# Patient Record
Sex: FEMALE | Race: WHITE
[De-identification: names, ages, dates, MRNs, and addresses within clinical notes are randomized per-mention and may not be internally consistent; named-entity substitution may affect disease eponyms.]

---

## 2017-12-07 ENCOUNTER — HOSPITAL ENCOUNTER (EMERGENCY)
Dept: HOSPITAL 53 - M ED | Age: 82
Discharge: HOME | End: 2017-12-07
Payer: MEDICAID

## 2017-12-07 VITALS — DIASTOLIC BLOOD PRESSURE: 70 MMHG | SYSTOLIC BLOOD PRESSURE: 166 MMHG

## 2017-12-07 VITALS — WEIGHT: 169.32 LBS | HEIGHT: 61 IN | BODY MASS INDEX: 31.97 KG/M2
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
## 2017-12-07 DIAGNOSIS — S93.401A: Primary | ICD-10-CM

## 2017-12-07 DIAGNOSIS — Z88.5: ICD-10-CM

## 2017-12-07 DIAGNOSIS — D50.9: ICD-10-CM

## 2017-12-07 DIAGNOSIS — I10: ICD-10-CM

## 2017-12-07 DIAGNOSIS — E11.9: ICD-10-CM

## 2017-12-07 DIAGNOSIS — E78.00: ICD-10-CM

## 2017-12-07 DIAGNOSIS — E73.9: ICD-10-CM

## 2017-12-07 DIAGNOSIS — Z87.891: ICD-10-CM
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
## 2017-12-07 DIAGNOSIS — Z79.899: ICD-10-CM

## 2017-12-07 DIAGNOSIS — Z79.84: ICD-10-CM

## 2017-12-07 DIAGNOSIS — Z79.82: ICD-10-CM
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
## 2017-12-07 NOTE — REP
RIGHT FOOT COMPLETE:  12/07/2017

 

CLINICAL HISTORY:   Trauma.

 

Comparison is the right ankle this date.

 

Prominent plantar calcaneal spur noted.  Subtalar joints intact.  Small ossific

avulsion of the distal tip of the medial malleolus and presumed new avulsion off

the lateral malleolar tip.  Talonavicular calcaneal cuboid joints preserved.

Tiny calcific densities anterior to the ankle margin noted.  The anterior process

of the talus showed small ossific avulsions on the ankle series but obscured on

this examination.  Tarsal bones and their articulations show no definite acute

fractures.  The proximal metatarsals show degenerative changes without definite

acute fracture.  MTP and IP joints show degenerative changes.

 

IMPRESSION:

 

1.  Prominent soft tissue swelling ankle and hindfoot, tiny avulsion off the

distal tip of the lateral malleolus likely acute, old avulsion off the distal tip

medial malleolus, better seen on the ankle series.   No other significant or

acute bony finding.

 

 

Signed by

Nas Ross MD 12/07/2017 08:32 P

## 2017-12-07 NOTE — REP
RIGHT TIBIA-FIBULA:  12/07/2017.

 

Clinical history:  Trauma.

 

Comparison:  No prior study.

 

Findings:  The two views show the proximal two-thirds of the tibia and fibula as

well as the knee in part.  There is a knee arthroplasty with the components

visible well aligned in relationship to the native bone and each other.  I do not

see a visible acute fracture of the tibia or fibula.  There are some vascular

calcifications evident.

 

Impression:

 

1.  Prior knee arthroplasty with visible tibia and fibula without fracture or

focal lesion.

 

 

Signed by

Nas Ross MD 12/08/2017 08:24 A

## 2017-12-07 NOTE — REP
RIGHT ANKLE COMPLETE:  12/07/2017

 

COMPARISON:  Right tibia-fibula and foot series this date.

 

CLINICAL HISTORY:  Trauma.

 

Four views show prominent swelling about the anterolateral aspect of the ankle

and hind foot.  Mortise joint appears grossly symmetric and preserved.  There is

smoothly marginated ossific density inferior to the medial malleolus.  There are

tiny ossific densities on the lateral view anterior to the tibial plafond and

talus and on oblique view, there are tiny ossific densities suggesting avulsions

off the lateral aspect anterior talus.  Small avulsions off the distal tip of the

fibula also suggested.  This suggests ligamentous avulsion injury.  There is a

plantar heel spur without an Achilles spur.  The subtalar joints intact.

Talonavicular, calcaneocuboid joints unremarkable.  Tarsal bones visible are

intact.  I do not see acute fracture of the proximal metatarsals.

 

IMPRESSION:

 

1. Prominent soft tissue swelling of the anterolateral aspect of the ankle with

tiny avulsions off the talus and inferior aspect of the lateral malleolus

suggesting ligamentous avulsion.

 

2. Tiny old avulsion off the medial malleolus with the mortise joint intact.

 

3.  Plantar calcaneal spur.  Degenerative changes.  Tarsal bones without acute

fracture. Mortise joint preserved.

 

 

Signed by

Nas Ross MD 12/07/2017 08:32 P

## 2017-12-11 NOTE — ED PDOC
Post-Departure Follow-Up


s[facility] and [facility] faxed formal report of right foot film for [facility] [place]-Gray,Maja MD Dec 11, 2017 13:09

## 2018-04-13 ENCOUNTER — HOSPITAL ENCOUNTER (OUTPATIENT)
Dept: HOSPITAL 53 - M RAD | Age: 83
End: 2018-04-13
Attending: NURSE PRACTITIONER
Payer: MEDICARE

## 2018-04-13 DIAGNOSIS — R63.0: ICD-10-CM

## 2018-04-13 DIAGNOSIS — R11.10: Primary | ICD-10-CM

## 2018-04-13 DIAGNOSIS — R63.4: ICD-10-CM

## 2018-05-19 ENCOUNTER — HOSPITAL ENCOUNTER (INPATIENT)
Dept: HOSPITAL 53 - M ED | Age: 83
LOS: 5 days | Discharge: NURSING, REHAB OR LONG-TERM CARE | DRG: 308 | End: 2018-05-24
Attending: GENERAL PRACTICE | Admitting: INTERNAL MEDICINE
Payer: MEDICARE

## 2018-05-19 DIAGNOSIS — I82.432: ICD-10-CM

## 2018-05-19 DIAGNOSIS — I48.2: Primary | ICD-10-CM

## 2018-05-19 DIAGNOSIS — I25.5: ICD-10-CM

## 2018-05-19 DIAGNOSIS — Z79.899: ICD-10-CM

## 2018-05-19 DIAGNOSIS — I11.0: ICD-10-CM

## 2018-05-19 DIAGNOSIS — I50.22: ICD-10-CM

## 2018-05-19 DIAGNOSIS — Z88.8: ICD-10-CM

## 2018-05-19 DIAGNOSIS — Z88.5: ICD-10-CM

## 2018-05-19 DIAGNOSIS — R62.7: ICD-10-CM

## 2018-05-19 DIAGNOSIS — I26.99: ICD-10-CM

## 2018-05-19 DIAGNOSIS — E11.9: ICD-10-CM

## 2018-05-19 LAB
ALBUMIN/GLOBULIN RATIO: 1.03 (ref 1–1.93)
ALBUMIN: 3.7 GM/DL (ref 3.2–5.2)
ALKALINE PHOSPHATASE: 70 U/L (ref 45–117)
ALT SERPL W P-5'-P-CCNC: 19 U/L (ref 12–78)
AMMONIA PLAS-SCNC: < 10 UMOL/L (ref ?–32)
ANION GAP: 10 MEQ/L (ref 8–16)
AST SERPL-CCNC: 18 U/L (ref 7–37)
BASO #: 0 10^3/UL (ref 0–0.2)
BASO %: 0.2 % (ref 0–1)
BILIRUB CONJ SERPL-MCNC: 0.2 MG/DL (ref 0–0.2)
BILIRUBIN,TOTAL: 0.6 MG/DL (ref 0.2–1)
BLOOD UREA NITROGEN: 21 MG/DL (ref 7–18)
CALCIUM LEVEL: 9.2 MG/DL (ref 8.8–10.2)
CARBON DIOXIDE LEVEL: 24 MEQ/L (ref 21–32)
CHLORIDE LEVEL: 107 MEQ/L (ref 98–107)
CK MB CFR.DF SERPL CALC: 1.58
CK SERPL-CCNC: 151 U/L (ref 26–192)
CK-MB VALUE MASS: 2.4 NG/ML (ref ?–3.6)
CREATININE FOR GFR: 1.17 MG/DL (ref 0.55–1.3)
EOS #: 0 10^3/UL (ref 0–0.5)
EOSINOPHIL NFR BLD AUTO: 0.1 % (ref 0–3)
GFR SERPL CREATININE-BSD FRML MDRD: 47.4 ML/MIN/{1.73_M2} (ref 32–?)
GLUCOSE BLDC GLUCOMTR-MCNC: 164 MG/DL (ref 83–110)
GLUCOSE, FASTING: 148 MG/DL (ref 70–100)
HEMATOCRIT: 36.6 % (ref 36–47)
HEMOGLOBIN: 12 G/DL (ref 12–15.5)
IMMATURE GRANULOCYTE %: 1 % (ref 0–3)
INR: 1.16
LEUKOCYTE ESTERASE UR AUTO RFX: (no result)
LYMPH #: 1 10^3/UL (ref 1.5–4.5)
LYMPH %: 8.8 % (ref 24–44)
MAGNESIUM LEVEL: 1.8 MG/DL (ref 1.8–2.4)
MEAN CORPUSCULAR HEMOGLOBIN: 29.6 PG (ref 27–33)
MEAN CORPUSCULAR HGB CONC: 32.8 G/DL (ref 32–36.5)
MEAN CORPUSCULAR VOLUME: 90.4 FL (ref 80–96)
MONO #: 0.9 10^3/UL (ref 0–0.8)
MONO %: 8.7 % (ref 0–5)
NEUTROPHILS #: 8.8 10^3/UL (ref 1.8–7.7)
NEUTROPHILS %: 81.2 % (ref 36–66)
NRBC BLD AUTO-RTO: 0 % (ref 0–0)
NT-PRO BNP: 5877 PG/ML (ref ?–450)
PARTIAL THROMBOPLASTIN TIME: 28.1 SECONDS (ref 26.8–37.9)
PLATELET COUNT, AUTOMATED: 161 10^3/UL (ref 150–450)
POTASSIUM SERUM: 4.5 MEQ/L (ref 3.5–5.1)
PROTHROMBIN TIME: 15 SECONDS (ref 12.4–14.5)
RED BLOOD COUNT: 4.05 10^6/UL (ref 4–5.4)
RED CELL DISTRIBUTION WIDTH: 13.9 % (ref 11.5–14.5)
SODIUM LEVEL: 141 MEQ/L (ref 136–145)
SPECIFIC GRAVITY UR AUTO RFX: >1.06 (ref 1–1.03)
SQUAM EPITHELIAL CELL UR AURFX: 9 /HPF (ref 0–6)
THYROID STIMULATING HORMONE: 1.93 UIU/ML (ref 0.36–3.74)
TOTAL PROTEIN: 7.3 GM/DL (ref 6.4–8.2)
TROPONIN I: 0.04 NG/ML (ref ?–0.1)
VENOUS BASE EXCESS: -1 (ref -2–2)
VENOUS HCO3: 24.4 MEQ/L (ref 23–27)
VENOUS O2 SATURATION: 93.2 % (ref 60–80)
VENOUS PARTIAL PRESSURE CO2: 43.5 MMHG (ref 38–50)
VENOUS PARTIAL PRESSURE O2: 70.2 MMHG (ref 30–50)
VENOUS PH: 7.37 UNITS (ref 7.33–7.43)
VENOUS STANDARD HCO3: 23.5 MEQ/L
VENOUS TOTAL CO2: 25.7 MEQ/L (ref 24–28)
WHITE BLOOD COUNT: 10.9 10^3/UL (ref 4–10)

## 2018-05-19 RX ADMIN — SODIUM CHLORIDE 1 MLS/HR: 9 INJECTION, SOLUTION INTRAVENOUS at 14:52

## 2018-05-19 RX ADMIN — SODIUM CHLORIDE 1 MLS/HR: 9 INJECTION, SOLUTION INTRAVENOUS at 14:19

## 2018-05-19 RX ADMIN — METOPROLOL TARTRATE 1 MG: 5 INJECTION INTRAVENOUS at 14:52

## 2018-05-19 RX ADMIN — ENOXAPARIN SODIUM 1 MG: 80 INJECTION SUBCUTANEOUS at 20:09

## 2018-05-19 RX ADMIN — METOPROLOL TARTRATE 1 MG: 5 INJECTION INTRAVENOUS at 14:19

## 2018-05-19 RX ADMIN — METOPROLOL TARTRATE 1 MG: 5 INJECTION INTRAVENOUS at 15:46

## 2018-05-19 RX ADMIN — SODIUM CHLORIDE 1 MLS/HR: 9 INJECTION, SOLUTION INTRAVENOUS at 16:15

## 2018-05-19 RX ADMIN — METOPROLOL TARTRATE 1 MG: 25 TABLET, FILM COATED ORAL at 20:09

## 2018-05-19 RX ADMIN — METOPROLOL TARTRATE 1 MG: 25 TABLET, FILM COATED ORAL at 14:17

## 2018-05-19 RX ADMIN — CEFTRIAXONE 1 MLS/HR: 1 INJECTION, POWDER, FOR SOLUTION INTRAMUSCULAR; INTRAVENOUS at 20:08

## 2018-05-20 LAB
ANION GAP: 7 MEQ/L (ref 8–16)
BLOOD UREA NITROGEN: 19 MG/DL (ref 7–18)
CALCIUM LEVEL: 8.2 MG/DL (ref 8.8–10.2)
CARBON DIOXIDE LEVEL: 24 MEQ/L (ref 21–32)
CHLORIDE LEVEL: 111 MEQ/L (ref 98–107)
CREATININE FOR GFR: 0.79 MG/DL (ref 0.55–1.3)
GFR SERPL CREATININE-BSD FRML MDRD: > 60 ML/MIN/{1.73_M2} (ref 32–?)
GLUCOSE, FASTING: 100 MG/DL (ref 70–100)
HEMATOCRIT: 32.1 % (ref 36–47)
HEMOGLOBIN: 10.5 G/DL (ref 12–15.5)
MAGNESIUM LEVEL: 1.7 MG/DL (ref 1.8–2.4)
MEAN CORPUSCULAR HEMOGLOBIN: 29.5 PG (ref 27–33)
MEAN CORPUSCULAR HGB CONC: 32.7 G/DL (ref 32–36.5)
MEAN CORPUSCULAR VOLUME: 90.2 FL (ref 80–96)
NRBC BLD AUTO-RTO: 0 % (ref 0–0)
PLATELET COUNT, AUTOMATED: 147 10^3/UL (ref 150–450)
POTASSIUM SERUM: 4 MEQ/L (ref 3.5–5.1)
RED BLOOD COUNT: 3.56 10^6/UL (ref 4–5.4)
RED CELL DISTRIBUTION WIDTH: 13.9 % (ref 11.5–14.5)
SODIUM LEVEL: 142 MEQ/L (ref 136–145)
WHITE BLOOD COUNT: 7.9 10^3/UL (ref 4–10)

## 2018-05-20 RX ADMIN — DIGOXIN 1 MG: 0.25 INJECTION INTRAMUSCULAR; INTRAVENOUS at 20:11

## 2018-05-20 RX ADMIN — NYSTATIN 1 DOSE: 100000 POWDER TOPICAL at 20:12

## 2018-05-20 RX ADMIN — DIGOXIN 1 MG: 0.25 INJECTION INTRAMUSCULAR; INTRAVENOUS at 08:34

## 2018-05-20 RX ADMIN — METOPROLOL TARTRATE 1 MG: 25 TABLET, FILM COATED ORAL at 06:00

## 2018-05-20 RX ADMIN — ENOXAPARIN SODIUM 1 MG: 80 INJECTION SUBCUTANEOUS at 08:35

## 2018-05-20 RX ADMIN — CEFTRIAXONE 1 MLS/HR: 1 INJECTION, POWDER, FOR SOLUTION INTRAMUSCULAR; INTRAVENOUS at 20:12

## 2018-05-20 RX ADMIN — MULTIPLE VITAMINS W/ MINERALS TAB 1 TAB: TAB at 08:35

## 2018-05-20 RX ADMIN — DIGOXIN 1 MG: 0.25 INJECTION INTRAMUSCULAR; INTRAVENOUS at 14:21

## 2018-05-20 RX ADMIN — METOPROLOL TARTRATE 1 MG: 25 TABLET, FILM COATED ORAL at 00:46

## 2018-05-20 RX ADMIN — ENOXAPARIN SODIUM 1 MG: 80 INJECTION SUBCUTANEOUS at 20:12

## 2018-05-20 RX ADMIN — MAGNESIUM SULFATE IN DEXTROSE 1 MLS/HR: 10 INJECTION, SOLUTION INTRAVENOUS at 08:35

## 2018-05-20 RX ADMIN — NYSTATIN 1 DOSE: 100000 POWDER TOPICAL at 09:00

## 2018-05-21 LAB
ANION GAP: 8 MEQ/L (ref 8–16)
BLOOD UREA NITROGEN: 16 MG/DL (ref 7–18)
CALCIUM LEVEL: 8.3 MG/DL (ref 8.8–10.2)
CARBON DIOXIDE LEVEL: 25 MEQ/L (ref 21–32)
CHLORIDE LEVEL: 110 MEQ/L (ref 98–107)
CREATININE FOR GFR: 0.82 MG/DL (ref 0.55–1.3)
DIGOXIN LEVEL: 1 NG/ML (ref 0.5–2)
GFR SERPL CREATININE-BSD FRML MDRD: > 60 ML/MIN/{1.73_M2} (ref 32–?)
GLUCOSE, FASTING: 96 MG/DL (ref 70–100)
HEMATOCRIT: 33.7 % (ref 36–47)
HEMOGLOBIN: 11.1 G/DL (ref 12–15.5)
MAGNESIUM LEVEL: 1.9 MG/DL (ref 1.8–2.4)
MEAN CORPUSCULAR HEMOGLOBIN: 30.1 PG (ref 27–33)
MEAN CORPUSCULAR HGB CONC: 32.9 G/DL (ref 32–36.5)
MEAN CORPUSCULAR VOLUME: 91.3 FL (ref 80–96)
NRBC BLD AUTO-RTO: 0 % (ref 0–0)
PLATELET COUNT, AUTOMATED: 160 10^3/UL (ref 150–450)
POTASSIUM SERUM: 3.8 MEQ/L (ref 3.5–5.1)
RED BLOOD COUNT: 3.69 10^6/UL (ref 4–5.4)
RED CELL DISTRIBUTION WIDTH: 13.8 % (ref 11.5–14.5)
SODIUM LEVEL: 143 MEQ/L (ref 136–145)
WHITE BLOOD COUNT: 8.3 10^3/UL (ref 4–10)

## 2018-05-21 RX ADMIN — SODIUM CHLORIDE, PRESERVATIVE FREE 1 ML: 5 INJECTION INTRAVENOUS at 20:11

## 2018-05-21 RX ADMIN — METOPROLOL TARTRATE 1 MG: 5 INJECTION INTRAVENOUS at 06:30

## 2018-05-21 RX ADMIN — ATENOLOL 1 MG: 25 TABLET ORAL at 09:00

## 2018-05-21 RX ADMIN — ENOXAPARIN SODIUM 1 MG: 80 INJECTION SUBCUTANEOUS at 10:08

## 2018-05-21 RX ADMIN — SODIUM CHLORIDE, PRESERVATIVE FREE 1 ML: 5 INJECTION INTRAVENOUS at 13:24

## 2018-05-21 RX ADMIN — ATENOLOL 1 MG: 25 TABLET ORAL at 20:10

## 2018-05-21 RX ADMIN — MULTIPLE VITAMINS W/ MINERALS TAB 1 TAB: TAB at 10:07

## 2018-05-21 RX ADMIN — ENOXAPARIN SODIUM 1 MG: 80 INJECTION SUBCUTANEOUS at 20:11

## 2018-05-21 RX ADMIN — NYSTATIN 1 DOSE: 100000 POWDER TOPICAL at 20:11

## 2018-05-21 RX ADMIN — NYSTATIN 1 DOSE: 100000 POWDER TOPICAL at 10:08

## 2018-05-21 RX ADMIN — SODIUM CHLORIDE, PRESERVATIVE FREE 1 ML: 5 INJECTION INTRAVENOUS at 21:33

## 2018-05-21 RX ADMIN — METOPROLOL TARTRATE 1 MG: 5 INJECTION INTRAVENOUS at 13:23

## 2018-05-22 LAB
ANION GAP: 7 MEQ/L (ref 8–16)
BLOOD UREA NITROGEN: 11 MG/DL (ref 7–18)
CALCIUM LEVEL: 8.1 MG/DL (ref 8.8–10.2)
CARBON DIOXIDE LEVEL: 26 MEQ/L (ref 21–32)
CHLORIDE LEVEL: 110 MEQ/L (ref 98–107)
CREATININE FOR GFR: 0.7 MG/DL (ref 0.55–1.3)
DIGOXIN LEVEL: 0.7 NG/ML (ref 0.5–2)
GFR SERPL CREATININE-BSD FRML MDRD: > 60 ML/MIN/{1.73_M2} (ref 32–?)
GLUCOSE, FASTING: 96 MG/DL (ref 70–100)
HEMATOCRIT: 35.1 % (ref 36–47)
HEMOGLOBIN: 11.4 G/DL (ref 12–15.5)
MAGNESIUM LEVEL: 1.8 MG/DL (ref 1.8–2.4)
MEAN CORPUSCULAR HEMOGLOBIN: 29.6 PG (ref 27–33)
MEAN CORPUSCULAR HGB CONC: 32.5 G/DL (ref 32–36.5)
MEAN CORPUSCULAR VOLUME: 91.2 FL (ref 80–96)
NRBC BLD AUTO-RTO: 0 % (ref 0–0)
PLATELET COUNT, AUTOMATED: 191 10^3/UL (ref 150–450)
POTASSIUM SERUM: 4.2 MEQ/L (ref 3.5–5.1)
RED BLOOD COUNT: 3.85 10^6/UL (ref 4–5.4)
RED CELL DISTRIBUTION WIDTH: 13.7 % (ref 11.5–14.5)
SODIUM LEVEL: 143 MEQ/L (ref 136–145)
WHITE BLOOD COUNT: 8 10^3/UL (ref 4–10)

## 2018-05-22 RX ADMIN — NYSTATIN 1 DOSE: 100000 POWDER TOPICAL at 07:44

## 2018-05-22 RX ADMIN — SODIUM CHLORIDE, PRESERVATIVE FREE 1 ML: 5 INJECTION INTRAVENOUS at 06:00

## 2018-05-22 RX ADMIN — ATENOLOL 1 MG: 25 TABLET ORAL at 07:45

## 2018-05-22 RX ADMIN — SODIUM CHLORIDE, PRESERVATIVE FREE 1 ML: 5 INJECTION INTRAVENOUS at 20:23

## 2018-05-22 RX ADMIN — ENOXAPARIN SODIUM 1 MG: 80 INJECTION SUBCUTANEOUS at 07:45

## 2018-05-22 RX ADMIN — SODIUM CHLORIDE, PRESERVATIVE FREE 1 ML: 5 INJECTION INTRAVENOUS at 14:00

## 2018-05-22 RX ADMIN — NYSTATIN 1 DOSE: 100000 POWDER TOPICAL at 20:22

## 2018-05-22 RX ADMIN — ATENOLOL 1 MG: 25 TABLET ORAL at 20:20

## 2018-05-22 RX ADMIN — SODIUM CHLORIDE, PRESERVATIVE FREE 1 ML: 5 INJECTION INTRAVENOUS at 22:00

## 2018-05-22 RX ADMIN — MULTIPLE VITAMINS W/ MINERALS TAB 1 TAB: TAB at 07:44

## 2018-05-22 RX ADMIN — ENOXAPARIN SODIUM 1 MG: 80 INJECTION SUBCUTANEOUS at 20:22

## 2018-05-23 LAB
ANION GAP: 6 MEQ/L (ref 8–16)
BLOOD UREA NITROGEN: 12 MG/DL (ref 7–18)
CALCIUM LEVEL: 8.6 MG/DL (ref 8.8–10.2)
CARBON DIOXIDE LEVEL: 27 MEQ/L (ref 21–32)
CHLORIDE LEVEL: 108 MEQ/L (ref 98–107)
CREATININE FOR GFR: 0.66 MG/DL (ref 0.55–1.3)
DIGOXIN LEVEL: 0.6 NG/ML (ref 0.5–2)
DRVV CONFIRM: 42.7 SEC
DRVVT CONFIRM PPP QL: 53.9 SEC
DRVVT SCREEN TO CONFIRM RATIO: 1.3 {RATIO} (ref 0–1.2)
GFR SERPL CREATININE-BSD FRML MDRD: > 60 ML/MIN/{1.73_M2} (ref 32–?)
GLUCOSE, FASTING: 105 MG/DL (ref 70–100)
HEMATOCRIT: 33.5 % (ref 36–47)
HEMOGLOBIN: 11 G/DL (ref 12–15.5)
LUPUS CONFIRM RATIO: 1.1
MAGNESIUM LEVEL: 1.8 MG/DL (ref 1.8–2.4)
MEAN CORPUSCULAR HEMOGLOBIN: 29.6 PG (ref 27–33)
MEAN CORPUSCULAR HGB CONC: 32.8 G/DL (ref 32–36.5)
MEAN CORPUSCULAR VOLUME: 90.1 FL (ref 80–96)
NORMALIZED RATIO: 1.18 (ref 0–1.2)
NRBC BLD AUTO-RTO: 0 % (ref 0–0)
PLATELET COUNT, AUTOMATED: 210 10^3/UL (ref 150–450)
POTASSIUM SERUM: 4.1 MEQ/L (ref 3.5–5.1)
RED BLOOD COUNT: 3.72 10^6/UL (ref 4–5.4)
RED CELL DISTRIBUTION WIDTH: 13.9 % (ref 11.5–14.5)
SODIUM LEVEL: 141 MEQ/L (ref 136–145)
WHITE BLOOD COUNT: 8 10^3/UL (ref 4–10)

## 2018-05-23 RX ADMIN — SODIUM CHLORIDE, PRESERVATIVE FREE 1 ML: 5 INJECTION INTRAVENOUS at 11:14

## 2018-05-23 RX ADMIN — SODIUM CHLORIDE, PRESERVATIVE FREE 1 ML: 5 INJECTION INTRAVENOUS at 20:04

## 2018-05-23 RX ADMIN — ENOXAPARIN SODIUM 1 MG: 80 INJECTION SUBCUTANEOUS at 07:39

## 2018-05-23 RX ADMIN — DIGOXIN 1 MG: 250 TABLET ORAL at 07:39

## 2018-05-23 RX ADMIN — ENOXAPARIN SODIUM 1 MG: 80 INJECTION SUBCUTANEOUS at 20:03

## 2018-05-23 RX ADMIN — NYSTATIN 1 DOSE: 100000 POWDER TOPICAL at 20:03

## 2018-05-23 RX ADMIN — SODIUM CHLORIDE, PRESERVATIVE FREE 1 ML: 5 INJECTION INTRAVENOUS at 06:00

## 2018-05-23 RX ADMIN — DIGOXIN 1 MG: 250 TABLET ORAL at 08:31

## 2018-05-23 RX ADMIN — ATENOLOL 1 MG: 25 TABLET ORAL at 20:02

## 2018-05-23 RX ADMIN — ATENOLOL 1 MG: 25 TABLET ORAL at 08:31

## 2018-05-23 RX ADMIN — MULTIPLE VITAMINS W/ MINERALS TAB 1 TAB: TAB at 08:31

## 2018-05-23 RX ADMIN — NYSTATIN 1 DOSE: 100000 POWDER TOPICAL at 08:30

## 2018-05-24 LAB
ANION GAP: 7 MEQ/L (ref 8–16)
BLOOD UREA NITROGEN: 14 MG/DL (ref 7–18)
CALCIUM LEVEL: 8.1 MG/DL (ref 8.8–10.2)
CARBON DIOXIDE LEVEL: 26 MEQ/L (ref 21–32)
CHLORIDE LEVEL: 109 MEQ/L (ref 98–107)
CREATININE FOR GFR: 0.65 MG/DL (ref 0.55–1.3)
GFR SERPL CREATININE-BSD FRML MDRD: > 60 ML/MIN/{1.73_M2} (ref 32–?)
GLUCOSE, FASTING: 86 MG/DL (ref 70–100)
HEMATOCRIT: 33.1 % (ref 36–47)
HEMOGLOBIN: 10.9 G/DL (ref 12–15.5)
MAGNESIUM LEVEL: 1.8 MG/DL (ref 1.8–2.4)
MEAN CORPUSCULAR HEMOGLOBIN: 29.9 PG (ref 27–33)
MEAN CORPUSCULAR HGB CONC: 32.9 G/DL (ref 32–36.5)
MEAN CORPUSCULAR VOLUME: 90.7 FL (ref 80–96)
NRBC BLD AUTO-RTO: 0 % (ref 0–0)
PLATELET COUNT, AUTOMATED: 214 10^3/UL (ref 150–450)
POTASSIUM SERUM: 3.9 MEQ/L (ref 3.5–5.1)
RED BLOOD COUNT: 3.65 10^6/UL (ref 4–5.4)
RED CELL DISTRIBUTION WIDTH: 13.7 % (ref 11.5–14.5)
SODIUM LEVEL: 142 MEQ/L (ref 136–145)
WHITE BLOOD COUNT: 6.7 10^3/UL (ref 4–10)

## 2018-05-24 RX ADMIN — SODIUM CHLORIDE, PRESERVATIVE FREE 1 ML: 5 INJECTION INTRAVENOUS at 05:44

## 2018-05-24 RX ADMIN — SODIUM CHLORIDE, PRESERVATIVE FREE 1 ML: 5 INJECTION INTRAVENOUS at 11:36

## 2018-05-24 RX ADMIN — NYSTATIN 1 DOSE: 100000 POWDER TOPICAL at 08:48

## 2018-05-24 RX ADMIN — RAMIPRIL 1 MG: 1.25 CAPSULE ORAL at 08:48

## 2018-05-24 RX ADMIN — DIGOXIN 1 MG: 250 TABLET ORAL at 08:49

## 2018-05-24 RX ADMIN — ATENOLOL 1 MG: 25 TABLET ORAL at 08:49

## 2018-05-24 RX ADMIN — ENOXAPARIN SODIUM 1 MG: 80 INJECTION SUBCUTANEOUS at 08:48

## 2018-05-24 RX ADMIN — MULTIPLE VITAMINS W/ MINERALS TAB 1 TAB: TAB at 08:48

## 2018-05-26 ENCOUNTER — HOSPITAL ENCOUNTER (OUTPATIENT)
Dept: HOSPITAL 53 - SKLAB7 | Age: 83
End: 2018-05-26
Attending: INTERNAL MEDICINE

## 2018-05-26 DIAGNOSIS — I48.91: Primary | ICD-10-CM

## 2018-05-26 LAB
ANTI THROMBIN 3 ANTIGEN IMMUNO: 93 % (ref 72–124)
AT III ACT/NOR PPP CHRO: 120 % (ref 75–135)
CARDIOLIPIN IGA ANTIBODY: 9 APL U/ML (ref 0–11)
CARDIOLIPIN IGG SER IA-ACNC: <9 GPL U/ML (ref 0–14)
CARDIOLIPIN IGM SER IA-ACNC: <9 MPL U/ML (ref 0–12)
DIGOXIN LEVEL: 1.1 NG/ML (ref 0.5–2)
PHOSPHOLIPID SERPL-MCNC: 217 MG/DL (ref 150–250)
PROT C ACT/NOR PPP: 135 % (ref 73–180)
PROT S ACT/NOR PPP: 92 % (ref 63–140)

## 2018-05-30 ENCOUNTER — HOSPITAL ENCOUNTER (OUTPATIENT)
Dept: HOSPITAL 53 - SKLAB7 | Age: 83
End: 2018-05-30
Attending: INTERNAL MEDICINE

## 2018-05-30 DIAGNOSIS — R41.0: ICD-10-CM

## 2018-05-30 DIAGNOSIS — R53.83: ICD-10-CM

## 2018-05-30 DIAGNOSIS — I50.9: Primary | ICD-10-CM

## 2018-05-30 LAB
ALBUMIN: 3.1 GM/DL (ref 3.2–5.2)
ANION GAP: 11 MEQ/L (ref 8–16)
BLOOD UREA NITROGEN: 21 MG/DL (ref 7–18)
CALCIUM LEVEL: 8.5 MG/DL (ref 8.8–10.2)
CARBON DIOXIDE LEVEL: 22 MEQ/L (ref 21–32)
CHLORIDE LEVEL: 106 MEQ/L (ref 98–107)
CREATININE FOR GFR: 0.86 MG/DL (ref 0.55–1.3)
GFR SERPL CREATININE-BSD FRML MDRD: > 60 ML/MIN/{1.73_M2} (ref 32–?)
GLUCOSE, FASTING: 149 MG/DL (ref 70–100)
HEMATOCRIT: 34.5 % (ref 36–47)
HEMOGLOBIN: 11.5 G/DL (ref 12–15.5)
MEAN CORPUSCULAR HEMOGLOBIN: 29.9 PG (ref 27–33)
MEAN CORPUSCULAR HGB CONC: 33.3 G/DL (ref 32–36.5)
MEAN CORPUSCULAR VOLUME: 89.8 FL (ref 80–96)
NRBC BLD AUTO-RTO: 0 % (ref 0–0)
PHOSPHORUS LEVEL: 3.7 MG/DL (ref 2.5–4.9)
PLATELET COUNT, AUTOMATED: 303 10^3/UL (ref 150–450)
POTASSIUM SERUM: 4.4 MEQ/L (ref 3.5–5.1)
RED BLOOD COUNT: 3.84 10^6/UL (ref 4–5.4)
RED CELL DISTRIBUTION WIDTH: 14.4 % (ref 11.5–14.5)
SODIUM LEVEL: 139 MEQ/L (ref 136–145)
WHITE BLOOD COUNT: 9.9 10^3/UL (ref 4–10)

## 2018-06-09 ENCOUNTER — HOSPITAL ENCOUNTER (OUTPATIENT)
Dept: HOSPITAL 53 - SKLAB7 | Age: 83
End: 2018-06-09
Attending: INTERNAL MEDICINE
Payer: MEDICARE

## 2018-06-09 DIAGNOSIS — R53.83: Primary | ICD-10-CM

## 2018-06-09 LAB
ANION GAP: 6 MEQ/L (ref 8–16)
BLOOD UREA NITROGEN: 18 MG/DL (ref 7–18)
CALCIUM LEVEL: 8.9 MG/DL (ref 8.8–10.2)
CARBON DIOXIDE LEVEL: 27 MEQ/L (ref 21–32)
CHLORIDE LEVEL: 106 MEQ/L (ref 98–107)
CREATININE FOR GFR: 0.72 MG/DL (ref 0.55–1.3)
GFR SERPL CREATININE-BSD FRML MDRD: > 60 ML/MIN/{1.73_M2} (ref 32–?)
GLUCOSE, FASTING: 133 MG/DL (ref 70–100)
POTASSIUM SERUM: 4.6 MEQ/L (ref 3.5–5.1)
SODIUM LEVEL: 139 MEQ/L (ref 136–145)

## 2018-06-09 PROCEDURE — 82306 VITAMIN D 25 HYDROXY: CPT

## 2018-06-11 ENCOUNTER — HOSPITAL ENCOUNTER (OUTPATIENT)
Dept: HOSPITAL 53 - M RAD | Age: 83
End: 2018-06-11
Attending: INTERNAL MEDICINE
Payer: MEDICARE

## 2018-06-11 ENCOUNTER — HOSPITAL ENCOUNTER (OUTPATIENT)
Dept: HOSPITAL 53 - SKLAB7 | Age: 83
End: 2018-06-11
Attending: INTERNAL MEDICINE
Payer: MEDICARE

## 2018-06-11 DIAGNOSIS — D49.6: Primary | ICD-10-CM

## 2018-06-11 DIAGNOSIS — I48.91: Primary | ICD-10-CM

## 2018-06-11 DIAGNOSIS — R53.1: ICD-10-CM

## 2018-06-11 DIAGNOSIS — Z96.653: ICD-10-CM

## 2018-06-11 DIAGNOSIS — R93.1: Primary | ICD-10-CM

## 2018-06-11 DIAGNOSIS — I48.91: ICD-10-CM

## 2018-06-11 DIAGNOSIS — R93.1: ICD-10-CM

## 2018-06-11 LAB — 25(OH)D3 SERPL-MCNC: 16.8 NG/ML (ref 30–100)

## 2018-06-11 PROCEDURE — 93005 ELECTROCARDIOGRAM TRACING: CPT

## 2018-06-12 ENCOUNTER — HOSPITAL ENCOUNTER (OUTPATIENT)
Dept: HOSPITAL 53 - M LAB | Age: 83
End: 2018-06-12
Attending: INTERNAL MEDICINE
Payer: MEDICARE

## 2018-06-12 DIAGNOSIS — R93.8: Primary | ICD-10-CM

## 2018-06-12 PROCEDURE — 74178 CT ABD&PLV WO CNTR FLWD CNTR: CPT
